# Patient Record
Sex: FEMALE | Race: WHITE | NOT HISPANIC OR LATINO | URBAN - METROPOLITAN AREA
[De-identification: names, ages, dates, MRNs, and addresses within clinical notes are randomized per-mention and may not be internally consistent; named-entity substitution may affect disease eponyms.]

---

## 2018-07-11 ENCOUNTER — INPATIENT HOSPITAL (INPATIENT)
Dept: URBAN - METROPOLITAN AREA HOSPITAL 11 | Facility: HOSPITAL | Age: 68
End: 2018-07-11

## 2018-07-11 DIAGNOSIS — K25.0 ACUTE GASTRIC ULCER WITH HEMORRHAGE: ICD-10-CM

## 2018-07-11 DIAGNOSIS — K26.0 ACUTE DUODENAL ULCER WITH HEMORRHAGE: ICD-10-CM

## 2018-07-11 DIAGNOSIS — K92.2 GASTROINTESTINAL HEMORRHAGE, UNSPECIFIED: ICD-10-CM

## 2018-07-11 DIAGNOSIS — D62 ACUTE POSTHEMORRHAGIC ANEMIA: ICD-10-CM

## 2018-07-11 PROCEDURE — 99222 1ST HOSP IP/OBS MODERATE 55: CPT | Mod: 25

## 2018-07-11 PROCEDURE — 43255 EGD CONTROL BLEEDING ANY: CPT

## 2018-08-28 ENCOUNTER — OFFICE (INPATIENT)
Dept: URBAN - METROPOLITAN AREA CLINIC 33 | Facility: CLINIC | Age: 68
End: 2018-08-28

## 2018-08-28 VITALS
SYSTOLIC BLOOD PRESSURE: 136 MMHG | TEMPERATURE: 97.9 F | WEIGHT: 120 LBS | HEART RATE: 68 BPM | HEIGHT: 65 IN | DIASTOLIC BLOOD PRESSURE: 85 MMHG

## 2018-08-28 DIAGNOSIS — D50.9 IRON DEFICIENCY ANEMIA, UNSPECIFIED: ICD-10-CM

## 2018-08-28 DIAGNOSIS — K92.89 OTHER SPECIFIED DISEASES OF THE DIGESTIVE SYSTEM: ICD-10-CM

## 2018-08-28 DIAGNOSIS — K25.9 GASTRIC ULCER, UNSPECIFIED AS ACUTE OR CHRONIC, WITHOUT HEMO: ICD-10-CM

## 2018-08-28 PROCEDURE — 99215 OFFICE O/P EST HI 40 MIN: CPT

## 2018-08-28 RX ORDER — PANTOPRAZOLE SODIUM 40 MG/1
TABLET, DELAYED RELEASE ORAL
Qty: 60 | Refills: 5 | Status: ACTIVE
Start: 2018-08-28

## 2018-08-28 NOTE — SERVICEHPINOTES
Mrs. Urbina is a 68-year-old female patient who was admitted in [facility] with upper GI bleed and anemia. The patient reports that she has been having a hard time dealing with her 's suicide, 04/2018. She dealt with it drinking wine and purging after eating. Also reports taking Advil for back pain. She was admitted with weakness and the H/H 7.6/25.1. EGD was done by Dr. Parr. which revealed a large hiatal hernia, a large red, clotted and maroon blood in the stomach. A single large ulcer in the antrum which was treated with epinephrine injection and clipped. Large visible vessel in the duodenal weep. Epinephrine injection therapy then clipped. The patient also received blood transfusion.[facility] D[facility], she finished the Pantoprazole 40 mg BID for 2 weeks, until it ran out. She has been feeling better. Denies nausea, vomiting, dysphagia, acid reflux, abdominal pain, or dizziness. She has bowel movements up to 2 times daily, on BSS type 3-4. Occasionally, she has constipation. Denies melena, blood in stool or diarrhea. Zafar has been followup with PCP since hospital discharge. Hemoglobin from 07/15 was 10.5BRPrior to this episode, she was dealing with iron deficiency and had blood transfusion as well with out GI workup. [facility] Per patient it was clear. No personal history of colon polyps. Denies family history of colon cancer or polyps. Zafar has lost about 15 lbs since April. Does not have a good appetite. She sees psychiatrist.  SUKUMAR